# Patient Record
Sex: FEMALE | Race: WHITE | NOT HISPANIC OR LATINO | Employment: FULL TIME | URBAN - METROPOLITAN AREA
[De-identification: names, ages, dates, MRNs, and addresses within clinical notes are randomized per-mention and may not be internally consistent; named-entity substitution may affect disease eponyms.]

---

## 2020-07-20 ENCOUNTER — TRANSCRIBE ORDERS (OUTPATIENT)
Dept: NUTRITION | Facility: HOSPITAL | Age: 25
End: 2020-07-20

## 2020-07-20 DIAGNOSIS — R63.5 ABNORMAL WEIGHT GAIN: Primary | ICD-10-CM

## 2020-09-17 ENCOUNTER — HOSPITAL ENCOUNTER (OUTPATIENT)
Dept: NUTRITION | Facility: HOSPITAL | Age: 25
Setting detail: RECURRING SERIES
Discharge: HOME | End: 2020-09-17

## 2020-09-17 VITALS — HEIGHT: 65 IN | WEIGHT: 293 LBS | BODY MASS INDEX: 48.82 KG/M2

## 2020-09-17 PROCEDURE — 97802 MEDICAL NUTRITION INDIV IN: CPT

## 2020-09-17 NOTE — CONSULTS
Adult Outpatient Nutrition  Assessment/PES    Patient Name:  Kati Nation  YOB: 1995  MRN: [id number]    Assessment Date:  9/17/2020    Comments:      This medical referred consult was provided via telehealth as patient was unable to attend an in-office appointment today due to the COVID-19 crisis. Consent for treatment was given verbally. RD spent a total of 60 minutes with patient today.      Patient is a 24 year old female seeking support for weight loss. 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 Patient shared that she has never tried to lose weight before. She has seen several friends of hers try diets like the Whole 30 and is looking for a more realistic approach to lose weight. Patient reported she eats out at least once daily due to her busy work schedule. Patient shared her biggest challenge with her diet is preparing meals since she does not get home until 7pm. Patient would specifically like to learn meal prepping techniques and how to prepare healthy options at home. Patient completed health literacy prior to the nutrition visit and demonstrated adequate health literacy.     RD shared the importance of setting small, realistic goals to form lasting behavior change. RD discussed restaurant nutrition and gave recommendations to make healthier choices when eating out. Patient was able to teach back concepts discussed by suggesting a realistic change would be to order a regular sized meal instead of a large meal. RD also provided education on meal prep and meal planning techniques. Patient shared that the idea she found most realistic and helpful would be to set aside time on a weekend to start preparing her meals ahead of time so after work during the week all she has to do it cook the meal. Patient stated that her  does not work on Monday, Thursday, or Saturday, so he could help her prepare some meals as well so it is less work her here when she gets home.  "RD also discussed several snack ideas with the patient. RD recommended having a snack on the way home so she is less tempted to go through the drive through. Patient stated that idea may help hold her over until she is able to have dinner cooked. Patient set her own goals and rated her confidence a 7 on a scale of 1-10. Patient shared that she is excited to form new habits.         Materials mailed to patient include healthy snack ideas and various recipes.      Goals:  1) Meal prep on the weekend for dinner meals on Monday, Thursday, and Saturday.  2) Try some snack options off the snack ideas list.      Total of 60 minutes spent with patient on nutrition counseling. Education based on Academy of Dietetics and Nutrition guidelines. Patient was provided with RD's contact information. Follow up visit is scheduled for 10/09/2020 at 3:00 PM. Thank you for this referral.    General Info     Row Name 09/17/20 1134       Today's Session    Person(s) attending today's session  Patient     Services Used Today?  No       General Information    How Well Do You Speak English?  very well    Preferred Language  English    Lives With  spouse    Last grade of school completed  Patient is currently a college senior          Anthropometrics     Row Name 09/17/20 1141 09/17/20 1135       Anthropometrics    Height  165.1 cm (65\")  165.1 cm (65\")    Weight  --  (!) 140 kg (308 lb)       Ideal Body Weight (IBW)    Ideal Body Weight (IBW) (kg)  57.29  57.29    % Ideal Body Weight  --  243.86       Body Mass Index (BMI)    BMI (kg/m2)  --  51.36        Nutritional Info/Activity     Row Name 09/17/20 1136       Nutritional Information    Have you tried to lose weight before?  No    What cultural diet influences are important for you to follow?  Kosher    Do you have difficulty chewing food?  No    Functional Status  able to prepare meals;able to purchase food    List any food cravings/trigger foods you have  pop, cookies, fries, " "chips and queso    How often during the day do you find yourself snacking?  1-2 times    Food Behaviors  Boredom eater    Do you use Food Assistance programs (WIC, food stamps, food bank)?  no    Do you need information about Food Assistance programs?  no    How many times do you drink milk per day?  0    How many times do you eat fruit per day?  0    How many times do you eat vegetables per day?  0    How many times do you drink juice per day?  0    How many times do you eat candy/chocolates per day?  1    How many times do you eat baked goods per day?  1    How many times do you eat desserts per day?  1    How many times do you eat ice cream per day?  0    How many times do you eat snack foods per day?  -- 1-2    How many diet sodas do you drink per day?  0    How many regular sodas do you drink per day?  3    How many times do you eat ethnic food per day?  0    How many times do you drink alcohol per day?  0    How many times do you have caffeine per day?  3    How many servings of artificial sweetner do you have per day?  0    How many meals do you eat each day?  2    What is the biggest challenge you have with your diet?  Poor choices \"Fixing meals since I get home at 7 PM\"    What type of support do you currently use to help you with your health issues?  Patient stated she dos not currently have support    Enter everything you can remember eating in the last 24 hours (1 day)  Breakfast: 2 cups chocolate milk; Lunch: [employer] 4 for 4 Jr cheeseburger, 4 chicken nuggets, fries, soda; Dinner: Chicken strips, hot apples, potatoes, soda       Eating Environment    Eating environment  Work;Family       Physical Activity    Are you currently involved in an activity/exercise program?   No    How many minutes do you spend on exercise each day?  0    How would you rank exercise as an important health lifestyle practice?  7          Estimated/Assessed Needs     Row Name 09/17/20 1141 09/17/20 1135       Calculation " "Measurements    Weight Used For Calculations  140 kg (308 lb)  --    Height  165.1 cm (65\")  165.1 cm (65\")       Estimated/Assessed Needs    Additional Documentation  Garfield-St. Jeor Equation (Group)  --       Garfield-St. Jeor Equation    RMR (Garfield-St. Jeor Equation)  2142.955  --    Garfield-St. Jeor Activity Factors  1.2  --    Activity Factors (Garfield-St. Jeor)  3011.546  --                Problem/Interventions:  Problem 1     Row Name 09/17/20 1142          Nutrition Diagnoses Problem 1    Problem 1  Overweight/Obesity     Etiology (related to)  -- lifestyle     Signs/Symptoms (evidenced by)  BMI     BMI  Greater than 40                 Intervention Goal     Row Name 09/17/20 1142          Intervention Goal    General  Meet nutritional needs for age/condition     Weight  Appropriate weight loss             Education/Evaluation     Row Name 09/17/20 1142          Education    Education  Advised regarding habits/behavior     Advised Regarding Habits/Behavior  Eating out;Eating pattern;Food choices;Food prep;Meal planning        Monitor/Evaluation    Monitor  Weight     Education Follow-up  Reinforce PRN           Electronically signed by:  Christine Robles RD  09/17/20 11:43 EDT  "

## 2020-10-09 ENCOUNTER — APPOINTMENT (OUTPATIENT)
Dept: NUTRITION | Facility: HOSPITAL | Age: 25
End: 2020-10-09

## 2020-10-20 ENCOUNTER — HOSPITAL ENCOUNTER (OUTPATIENT)
Dept: NUTRITION | Facility: HOSPITAL | Age: 25
Setting detail: RECURRING SERIES
Discharge: HOME | End: 2020-10-20

## 2020-10-20 NOTE — CONSULTS
"Adult Outpatient Nutrition  Assessment/PES    Patient Name:  Kati Nation  YOB: 1995  MRN: [id number]    Assessment Date:  10/20/2020    Comments:      This medical referred consult was provided via telehealth as patient was unable to attend an in-office appointment today due to the COVID-19 crisis. Consent for treatment was given verbally. RD spent a total of 30 minutes with patient today.         Patient is a 24 year old female seen today for a nutrition follow up visit. Patient shared she has been able to meal prep and try new snacks. She identified barriers she encountered as being able to prep for one week, but struggling to get to the grocery store the next week. To overcome this barrier, patient suggested she would like to place a \"[employer] click list\" order several weeks in advance so all she will need to do is remember to pick it up at the set day and time. Additionally, patient has purchased snacks, but she has a difficult time remember where she stores them. We discussed different places to store them so they are easier to remember. She also identified a barrier to physical activity as when she gets home, she wants to watch TV. RD and patient discussed strategies to overcome this. Patient shared she would like to head to the gym right after work instead of heading home first. If there is a particular workout class that is later, she shared she could go to [facility] and look at the home and holiday decor instead of going home before her workout class. Patient was very motivated and eager to set goals to promote lasting behavior change.     Patient stated she would like to make another nutrition visit in a few months to follow up. She stated she would not like to schedule that now, but she would like to call back to schedule later. RD encouraged patient to contact her with any needs.       Goal completion:  1) Meal prep on the weekend for dinner meals on Monday, Thursday, and Saturday - " 100%  2) Try some snack options off the snack ideas list - 100%    New Goal:  1) Work out after work 2x per week for 30-60 min.       Total of 30 minutes spent with patient on nutrition counseling. Education based on Academy of Dietetics and Nutrition guidelines. Patient was provided with RD's contact information. Thank you for this referral.    General Info     Row Name 10/20/20 1545       Today's Session    Person(s) attending today's session  Patient     Services Used Today?  No       General Information    How Well Do You Speak English?  very well    Preferred Language  English    Lives With  spouse            Electronically signed by:  Christine Robles RD  10/20/20 15:46 EDT

## 2020-10-21 NOTE — ADDENDUM NOTE
Encounter addended by: Christine Robles RD on: 10/21/2020 8:18 AM   Actions taken: Clinical Note Signed

## 2020-10-29 ENCOUNTER — LAB (OUTPATIENT)
Dept: LAB | Facility: HOSPITAL | Age: 25
End: 2020-10-29

## 2020-10-29 ENCOUNTER — TRANSCRIBE ORDERS (OUTPATIENT)
Dept: LAB | Facility: HOSPITAL | Age: 25
End: 2020-10-29

## 2020-10-29 DIAGNOSIS — E66.9 OBESITY, UNSPECIFIED CLASSIFICATION, UNSPECIFIED OBESITY TYPE, UNSPECIFIED WHETHER SERIOUS COMORBIDITY PRESENT: ICD-10-CM

## 2020-10-29 DIAGNOSIS — R63.5 ABNORMAL WEIGHT GAIN: Primary | ICD-10-CM

## 2020-10-29 DIAGNOSIS — R63.5 ABNORMAL WEIGHT GAIN: ICD-10-CM

## 2020-10-29 LAB
CHOLEST SERPL-MCNC: 107 MG/DL (ref 0–200)
HBA1C MFR BLD: 4.7 % (ref 4.8–5.6)
HDLC SERPL-MCNC: 27 MG/DL (ref 40–60)
LDLC SERPL CALC-MCNC: 60 MG/DL (ref 0–100)
LDLC/HDLC SERPL: 2.17 {RATIO}
TRIGL SERPL-MCNC: 107 MG/DL (ref 0–150)
TSH SERPL DL<=0.05 MIU/L-ACNC: 2.07 UIU/ML (ref 0.27–4.2)
VLDLC SERPL-MCNC: 20 MG/DL (ref 5–40)

## 2020-10-29 PROCEDURE — 84443 ASSAY THYROID STIM HORMONE: CPT

## 2020-10-29 PROCEDURE — 83525 ASSAY OF INSULIN: CPT

## 2020-10-29 PROCEDURE — 36415 COLL VENOUS BLD VENIPUNCTURE: CPT

## 2020-10-29 PROCEDURE — 80061 LIPID PANEL: CPT

## 2020-10-29 PROCEDURE — 83036 HEMOGLOBIN GLYCOSYLATED A1C: CPT

## 2020-11-03 LAB — INSULIN SERPL-ACNC: 32 UIU/ML
